# Patient Record
(demographics unavailable — no encounter records)

---

## 2025-05-21 NOTE — CONSULT LETTER
[Dear  ___] : Dear  [unfilled], [Consult Letter:] : I had the pleasure of evaluating your patient, [unfilled]. [Please see my note below.] : Please see my note below. [Consult Closing:] : Thank you very much for allowing me to participate in the care of this patient.  If you have any questions, please do not hesitate to contact me. [Sincerely,] : Sincerely, [FreeTextEntry2] : AMADOR RAVINDER [FreeTextEntry3] : Elie Oliveros MD

## 2025-05-21 NOTE — PHYSICAL EXAM
[Healthy Appearing] : healthy appearing [Interactive] : interactive [Normal Appearance] : normal appearance [Well formed] : well formed [Normally Set] : normally set [Normal S1 and S2] : normal S1 and S2 [Clear to Ausculation Bilaterally] : clear to auscultation bilaterally [Abdomen Soft] : soft [Abdomen Tenderness] : non-tender [] : no hepatosplenomegaly [Negro Stage ___] : the Negro stage for breast development was [unfilled] [Normal] : normal  [Murmur] : no murmurs

## 2025-05-21 NOTE — HISTORY OF PRESENT ILLNESS
[Regular Periods] : regular periods [Headaches] : no headaches [Visual Symptoms] : no ~T visual symptoms [Polyuria] : no polyuria [FreeTextEntry2] :  Kelsy Chung, a 15-year-old female who returns for follow-up after more than a year since her last visit in February 2024. She continues to experience excessive sweating, which is not limited to hot environments. The sweating primarily affects her underarms, back, upper lip, and forehead area. Since her last visit, Katia has tried a roll-on chemical treatment prescribed by a dermatologist, but it has not been effective. She has also used Certain Dri deodorant in the past. Katia reports that the sweating is problematic even when wearing light clothing and affects her daily life, particularly at school. She has not been able to lose weight as was previously recommended, and her weight has actually increased by 7 kg since the last visit.  I reviewed blood work that was done in January 2025 that showed a normal comprehensive metabolic panel, normal lipids, normal hemoglobin A1c. I first evaluated Kelsy in Feb 2024 for excessive sweating (mostly under arms and back). Not palms. Started about 1 yr earlier. She had blood work done on 1/19/2024 that showed normal thyroid function with a TSH of 1.73 IU/mL, and a free T4 of 1.2 ng/deciliter. She had a normal CBC, CMP, hemoglobin A1c of 4.9%, and a very low vitamin D of 18.1 ng/mL. Subsequently she started taking vitamin D. She also had a history of dizziness and lightheadedness. She was evaluated by cardiology in June 2023 and found to have a normal cardiac exam, normal electrocardiogram and echocardiogram. She was diagnosed with orthostatic dizziness not associated with any cardiac abnormality. She was recommended to increase her fluid and salt intake. She was also evaluated by neurology in May 2023.  I reassured her that there was no endocrine cause for the excessive sweating but did recommend that she try to lose approximately 10% of her weight since that might help.   Satisfactory

## 2025-07-17 NOTE — HISTORY OF PRESENT ILLNESS
[FreeTextEntry1] : Kelsy is a 15 year old female here today to be evaluated for concerns of hyperhidrosis. She has experienced excessive sweating constantly over the last few years. She has tried treating in the past with prescribed deodorants and soaps, which have not improved her symptoms. She notes the most prominent areas of sweating are in her axilla and back, but she also sweats from her forehead area and upper lip. She notes the sweating has negatively impacted her quality of life and presents today to discuss surgical options.

## 2025-07-17 NOTE — ASSESSMENT
[FreeTextEntry1] : Kelsy is a 15 year old female with hyperhidrosis. She experiences excessive sweating mostly in her axillary regions and upper back that is not associated with any particular activity. I counseled Kelsy and mom and began by educating them on the natural history of hyperhidrosis and what this entails. I then reviewed the role of surgery to treat hyperhidrosis, but explained that it is commonly performed for isolated cases where the patients experience excessive sweating from the palms. I also explained that surgery would not necessarily aid with the sweating from her feet and upper back. Thus, I do not recommend any surgical intervention for Kelsy at this time. We discussed alternative options including Botox injections and I recommended the family follows up with their dermatologist to proceed should they wish to. Kelsy and mom demonstrated their understanding and we will follow up on an as-needed basis moving forward. They have my information and know to contact me with any questions or concerns.

## 2025-07-17 NOTE — ADDENDUM
[FreeTextEntry1] : Documented by Amador Ryena acting as a scribe for Dr. Mott on 07/17/2025.   All medical record entries made by the Scribe were at my, Dr. Mott, direction and personally dictated by me on 07/17/2025. I have reviewed the chart and agree that the record accurately reflects my personal performances of the history, physical exam, assessment and plan. I have also personally directed, reviewed, and agree with the instructions.

## 2025-07-17 NOTE — CONSULT LETTER
[Dear  ___] : Dear  [unfilled], [Consult Letter:] : I had the pleasure of evaluating your patient, [unfilled]. [Please see my note below.] : Please see my note below. [Consult Closing:] : Thank you very much for allowing me to participate in the care of this patient.  If you have any questions, please do not hesitate to contact me. [Sincerely,] : Sincerely, [FreeTextEntry2] : Petr Tan MD [FreeTextEntry3] : Ganesh Mott MD Division of Pediatric, General, Thoracic and Endoscopic Surgery [facility]